# Patient Record
Sex: FEMALE | Race: WHITE | Employment: OTHER | URBAN - METROPOLITAN AREA
[De-identification: names, ages, dates, MRNs, and addresses within clinical notes are randomized per-mention and may not be internally consistent; named-entity substitution may affect disease eponyms.]

---

## 2017-06-13 ENCOUNTER — DOCTOR'S OFFICE (OUTPATIENT)
Dept: URBAN - METROPOLITAN AREA CLINIC 125 | Facility: CLINIC | Age: 27
Setting detail: OPHTHALMOLOGY
End: 2017-06-13
Payer: COMMERCIAL

## 2017-06-13 DIAGNOSIS — H40.1132: ICD-10-CM

## 2017-06-13 DIAGNOSIS — H27.8: ICD-10-CM

## 2017-06-13 DIAGNOSIS — H26.40: ICD-10-CM

## 2017-06-13 PROCEDURE — 92012 INTRM OPH EXAM EST PATIENT: CPT | Performed by: OPHTHALMOLOGY

## 2017-06-13 ASSESSMENT — REFRACTION_MANIFEST
OD_VA2: 20/
OD_VA1: 20/
OU_VA: 20/
OD_VA1: 20/
OD_VA2: 20/
OS_VA3: 20/
OD_VA3: 20/
OD_VA3: 20/
OS_VA2: 20/
OS_VA3: 20/
OS_VA1: 20/
OD_VA3: 20/
OS_VA3: 20/
OD_VA1: 20/
OD_VA2: 20/
OU_VA: 20/
OS_VA2: 20/
OS_VA2: 20/
OS_VA1: 20/
OU_VA: 20/
OS_VA1: 20/

## 2017-06-13 ASSESSMENT — VISUAL ACUITY
OS_BCVA: 20/F&F
OD_BCVA: 20/F&F

## 2017-06-13 ASSESSMENT — REFRACTION_CURRENTRX
OS_OVR_VA: 20/
OD_OVR_VA: 20/

## 2017-06-13 ASSESSMENT — CONFRONTATIONAL VISUAL FIELD TEST (CVF)
OS_FINDINGS: FULL
OD_FINDINGS: FULL

## 2017-06-13 ASSESSMENT — SUPERFICIAL PUNCTATE KERATITIS (SPK)
OD_SPK: T
OS_SPK: T

## 2018-08-16 ENCOUNTER — DOCTOR'S OFFICE (OUTPATIENT)
Dept: URBAN - METROPOLITAN AREA CLINIC 125 | Facility: CLINIC | Age: 28
Setting detail: OPHTHALMOLOGY
End: 2018-08-16
Payer: COMMERCIAL

## 2018-08-16 DIAGNOSIS — H26.40: ICD-10-CM

## 2018-08-16 DIAGNOSIS — H40.1132: ICD-10-CM

## 2018-08-16 DIAGNOSIS — H27.8: ICD-10-CM

## 2018-08-16 PROCEDURE — 92014 COMPRE OPH EXAM EST PT 1/>: CPT | Performed by: OPHTHALMOLOGY

## 2018-08-16 ASSESSMENT — REFRACTION_MANIFEST
OS_VA2: 20/
OD_VA2: 20/
OD_VA1: 20/
OS_VA3: 20/
OD_VA3: 20/
OS_VA1: 20/
OS_VA1: 20/
OS_VA3: 20/
OS_VA3: 20/
OS_VA2: 20/
OD_VA2: 20/
OS_VA2: 20/
OU_VA: 20/
OD_VA1: 20/
OU_VA: 20/
OU_VA: 20/
OD_VA2: 20/
OD_VA1: 20/
OD_VA3: 20/
OS_VA1: 20/
OD_VA3: 20/

## 2018-08-16 ASSESSMENT — CONFRONTATIONAL VISUAL FIELD TEST (CVF)
OD_FINDINGS: FULL
OS_FINDINGS: FULL

## 2018-08-16 ASSESSMENT — REFRACTION_CURRENTRX
OS_OVR_VA: 20/
OD_OVR_VA: 20/
OS_OVR_VA: 20/
OD_OVR_VA: 20/
OD_OVR_VA: 20/
OS_OVR_VA: 20/

## 2018-08-16 ASSESSMENT — SUPERFICIAL PUNCTATE KERATITIS (SPK)
OS_SPK: T
OD_SPK: T

## 2018-08-16 ASSESSMENT — VISUAL ACUITY
OD_BCVA: 20/UNABLE
OS_BCVA: 20/UNABLE

## 2018-10-22 ENCOUNTER — OFFICE VISIT (OUTPATIENT)
Dept: URGENT CARE | Facility: CLINIC | Age: 28
End: 2018-10-22
Payer: MEDICARE

## 2018-10-22 VITALS
DIASTOLIC BLOOD PRESSURE: 69 MMHG | WEIGHT: 169.4 LBS | OXYGEN SATURATION: 96 % | SYSTOLIC BLOOD PRESSURE: 107 MMHG | RESPIRATION RATE: 16 BRPM | TEMPERATURE: 97.7 F | HEART RATE: 100 BPM

## 2018-10-22 DIAGNOSIS — J06.9 VIRAL UPPER RESPIRATORY TRACT INFECTION: ICD-10-CM

## 2018-10-22 DIAGNOSIS — R35.0 INCREASED URINARY FREQUENCY: Primary | ICD-10-CM

## 2018-10-22 LAB
SL AMB  POCT GLUCOSE, UA: NEGATIVE
SL AMB LEUKOCYTE ESTERASE,UA: ABNORMAL
SL AMB POCT BILIRUBIN,UA: NEGATIVE
SL AMB POCT BLOOD,UA: ABNORMAL
SL AMB POCT CLARITY,UA: CLEAR
SL AMB POCT COLOR,UA: YELLOW
SL AMB POCT KETONES,UA: ABNORMAL
SL AMB POCT NITRITE,UA: NEGATIVE
SL AMB POCT PH,UA: 5
SL AMB POCT SPECIFIC GRAVITY,UA: 1.03
SL AMB POCT URINE PROTEIN: ABNORMAL
SL AMB POCT UROBILINOGEN: NORMAL

## 2018-10-22 PROCEDURE — 87086 URINE CULTURE/COLONY COUNT: CPT | Performed by: EMERGENCY MEDICINE

## 2018-10-22 PROCEDURE — 99203 OFFICE O/P NEW LOW 30 MIN: CPT | Performed by: EMERGENCY MEDICINE

## 2018-10-22 RX ORDER — NITROFURANTOIN 25; 75 MG/1; MG/1
100 CAPSULE ORAL 2 TIMES DAILY
Qty: 14 CAPSULE | Refills: 0 | Status: SHIPPED | OUTPATIENT
Start: 2018-10-22 | End: 2018-10-29

## 2018-10-22 RX ORDER — PREDNISONE 10 MG/1
TABLET ORAL
Qty: 27 TABLET | Refills: 0 | Status: SHIPPED | OUTPATIENT
Start: 2018-10-22 | End: 2019-04-10

## 2018-10-22 RX ORDER — PHENAZOPYRIDINE HYDROCHLORIDE 200 MG/1
200 TABLET, FILM COATED ORAL
Qty: 10 TABLET | Refills: 0 | Status: SHIPPED | OUTPATIENT
Start: 2018-10-22 | End: 2019-04-10

## 2018-10-22 RX ORDER — LATANOPROST 50 UG/ML
SOLUTION/ DROPS OPHTHALMIC
COMMUNITY
Start: 2018-10-18

## 2018-10-22 RX ORDER — DORZOLAMIDE HYDROCHLORIDE AND TIMOLOL MALEATE 20; 5 MG/ML; MG/ML
SOLUTION/ DROPS OPHTHALMIC
COMMUNITY
Start: 2018-09-20

## 2018-10-22 RX ORDER — RISPERIDONE 2 MG/1
TABLET, FILM COATED ORAL
COMMUNITY
Start: 2018-10-18

## 2018-10-22 NOTE — PATIENT INSTRUCTIONS
Dysuria   WHAT YOU NEED TO KNOW:   Dysuria is difficulty urinating, or pain, burning, or discomfort with urination  Dysuria is usually a symptom of another problem  DISCHARGE INSTRUCTIONS:   Return to the emergency department if:   · You have severe back, side, or abdominal pain  · You have fever and shaking chills  · You vomit several times in a row  Contact your healthcare provider if:   · Your symptoms do not go away, even after treatment  · You have questions or concerns about your condition or care  Medicines:   · Medicines  may be given to help treat a bacterial infection or help decrease bladder spasms  · Take your medicine as directed  Contact your healthcare provider if you think your medicine is not helping or if you have side effects  Tell him of her if you are allergic to any medicine  Keep a list of the medicines, vitamins, and herbs you take  Include the amounts, and when and why you take them  Bring the list or the pill bottles to follow-up visits  Carry your medicine list with you in case of an emergency  Follow up with your healthcare provider as directed: Your healthcare provider may also refer you to a urologist or nephrologist to have additional testing  Write down your questions so you remember to ask them during your visits  Manage your dysuria:   · Drink more liquids  Liquids help flush out bacteria that may be causing an infection  Ask your healthcare provider how much liquid to drink each day and which liquids are best for you  · Take sitz baths as directed  Fill a bathtub with 4 to 6 inches of warm water  You may also use a sitz bath pan that fits over a toilet  Sit in the sitz bath for 20 minutes  Do this 2 to 3 times a day, or as directed  The warm water can help decrease pain and swelling  © 2017 [employer] Information is for End User's use only and may not be sold, redistributed or otherwise used for commercial purposes   All illustrations and images included in CareNotes® are the copyrighted property of [employer]  or [employer]  The above information is an  only  It is not intended as medical advice for individual conditions or treatments  Talk to your doctor, nurse or pharmacist before following any medical regimen to see if it is safe and effective for you  You have been diagnosed with a Viral Upper Respiratory infection and your symptoms should resolve over the next 7 to 10 days with the treatments recommended today  If they do not, it is possible that you have developed a bacterial infection and you should return  If you were to take the antibiotic while you are still in the viral stage, you will not get better any faster, but could kill off the good germs in your body as well as make the germs in you resistant to the antibiotic  Take an expectorant - guaifenesin should be the only ingredient - during the day, and the cough suppressant (ex  Robitussin DM or Tessalon) if needed at night only  Take Zinc 12 5 to 15 mg every 2 - 3 hrs while awake for the next few days  You may take Cold Eh (13 3 mg of Zinc) or split a 25 mg Zinc tablet or lozenge in two or a 50 mg into four to get the proper dose  The total daily dose of Zinc should exceed 75 mg per day  You may also take a decongestant like Sudafed, unless you have hypertension or cardiac disease  Hold any NSAIDs like Ibuprofen (Advil), Naprosyn (Aleve), etc while on steroids like Medrol or Prednisone      Upper Respiratory Infection   AMBULATORY CARE:   An upper respiratory infection  is also called a common cold  It can affect your nose, throat, ears, and sinuses  Common signs and symptoms include the following:  Cold symptoms are usually worst for the first 3 to 5 days   You may have any of the following:  · Runny or stuffy nose    · Sneezing and coughing    · Sore throat or hoarseness    · Red, watery, and sore eyes    · Fatigue     · Chills and fever    · Headache, body aches, or sore muscles  Seek care immediately if:   · You have chest pain or trouble breathing  Contact your healthcare provider if:   · You have a fever over 102ºF (39°C)  · Your sore throat gets worse or you see white or yellow spots in your throat  · Your symptoms get worse after 3 to 5 days or your cold is not better in 14 days  · You have a rash anywhere on your skin  · You have large, tender lumps in your neck  · You have thick, green or yellow drainage from your nose  · You cough up thick yellow, green, or bloody mucus  · You have vomiting for more than 24 hours and cannot keep fluids down  · You have a bad earache  · You have questions or concerns about your condition or care  Treatment for a cold: There is no cure for the common cold  Colds are caused by viruses and do not get better with antibiotics  Most people get better in 7 to 14 days  You may continue to cough for 2 to 3 weeks  The following may help decrease your symptoms:  · Decongestants  help reduce nasal congestion and help you breathe more easily  If you take decongestant pills, they may make you feel restless or not able to sleep  Do not use decongestant sprays for more than a few days  · Cough suppressants  help reduce coughing  Ask your healthcare provider which type of cough medicine is best for you  · NSAIDs , such as ibuprofen, help decrease swelling, pain, and fever  NSAIDs can cause stomach bleeding or kidney problems in certain people  If you take blood thinner medicine, always ask your healthcare provider if NSAIDs are safe for you  Always read the medicine label and follow directions  · Acetaminophen  decreases pain and fever  It is available without a doctor's order  Ask how much to take and how often to take it  Follow directions   Read the labels of all other medicines you are using to see if they also contain acetaminophen, or ask your doctor or pharmacist  Acetaminophen can cause liver damage if not taken correctly  Do not use more than 4 grams (4,000 milligrams) total of acetaminophen in one day  Manage your cold:   · Rest as much as possible  Slowly start to do more each day  · Drink more liquids as directed  Liquids will help thin and loosen mucus so you can cough it up  Liquids will also help prevent dehydration  Liquids that help prevent dehydration include water, fruit juice, and broth  Do not drink liquids that contain caffeine  Caffeine can increase your risk for dehydration  Ask your healthcare provider how much liquid to drink each day  · Soothe a sore throat  Gargle with warm salt water  This helps your sore throat feel better  Make salt water by dissolving ¼ teaspoon salt in 1 cup warm water  You may also suck on hard candy or throat lozenges  You may use a sore throat spray  · Use a humidifier or vaporizer  Use a cool mist humidifier or a vaporizer to increase air moisture in your home  This may make it easier for you to breathe and help decrease your cough  · Use saline nasal drops as directed  These help relieve congestion  · Apply petroleum-based jelly around the outside of your nostrils  This can decrease irritation from blowing your nose  · Do not smoke  Nicotine and other chemicals in cigarettes and cigars can make your symptoms worse  They can also cause infections such as bronchitis or pneumonia  Ask your healthcare provider for information if you currently smoke and need help to quit  E-cigarettes or smokeless tobacco still contain nicotine  Talk to your healthcare provider before you use these products  Prevent spreading your cold to others:   · Try to stay away from other people during the first 2 to 3 days of your cold when it is more easily spread  · Do not share food or drinks  · Do not share hand towels with household members  · Wash your hands often, especially after you blow your nose   Turn away from other people and cover your mouth and nose with a tissue when you sneeze or cough  Follow up with your healthcare provider as directed:  Write down your questions so you remember to ask them during your visits  © 2017 [address] [employer] Information is for End User's use only and may not be sold, redistributed or otherwise used for commercial purposes  All illustrations and images included in CareNotes® are the copyrighted property of [employer]  or [employer]  The above information is an  only  It is not intended as medical advice for individual conditions or treatments  Talk to your doctor, nurse or pharmacist before following any medical regimen to see if it is safe and effective for you

## 2018-10-22 NOTE — PROGRESS NOTES
[facility]        NAME: Hipolito Chávez is a [address]  female  : 1990    MRN: [id number]  DATE: 2018  TIME: 10:43 AM    Assessment and Plan   Increased urinary frequency [R35 0]  1  Increased urinary frequency  POCT urine dip    nitrofurantoin (MACROBID) 100 mg capsule    phenazopyridine (PYRIDIUM) 200 mg tablet   2  Viral upper respiratory tract infection  predniSONE 10 mg tablet         Patient Instructions     Patient Instructions     Dysuria   WHAT YOU NEED TO KNOW:   Dysuria is difficulty urinating, or pain, burning, or discomfort with urination  Dysuria is usually a symptom of another problem  DISCHARGE INSTRUCTIONS:   Return to the emergency department if:   · You have severe back, side, or abdominal pain  · You have fever and shaking chills  · You vomit several times in a row  Contact your healthcare provider if:   · Your symptoms do not go away, even after treatment  · You have questions or concerns about your condition or care  Medicines:   · Medicines  may be given to help treat a bacterial infection or help decrease bladder spasms  · Take your medicine as directed  Contact your healthcare provider if you think your medicine is not helping or if you have side effects  Tell him of her if you are allergic to any medicine  Keep a list of the medicines, vitamins, and herbs you take  Include the amounts, and when and why you take them  Bring the list or the pill bottles to follow-up visits  Carry your medicine list with you in case of an emergency  Follow up with your healthcare provider as directed: Your healthcare provider may also refer you to a urologist or nephrologist to have additional testing  Write down your questions so you remember to ask them during your visits  Manage your dysuria:   · Drink more liquids  Liquids help flush out bacteria that may be causing an infection   Ask your healthcare provider how much liquid to drink each day and which liquids are best for you  · Take sitz baths as directed  Fill a bathtub with 4 to 6 inches of warm water  You may also use a sitz bath pan that fits over a toilet  Sit in the sitz bath for 20 minutes  Do this 2 to 3 times a day, or as directed  The warm water can help decrease pain and swelling  © 2017 [address] [employer] Information is for End User's use only and may not be sold, redistributed or otherwise used for commercial purposes  All illustrations and images included in CareNotes® are the copyrighted property of [employer]  or [employer]  The above information is an  only  It is not intended as medical advice for individual conditions or treatments  Talk to your doctor, nurse or pharmacist before following any medical regimen to see if it is safe and effective for you  You have been diagnosed with a Viral Upper Respiratory infection and your symptoms should resolve over the next 7 to 10 days with the treatments recommended today  If they do not, it is possible that you have developed a bacterial infection and you should return  If you were to take the antibiotic while you are still in the viral stage, you will not get better any faster, but could kill off the good germs in your body as well as make the germs in you resistant to the antibiotic  Take an expectorant - guaifenesin should be the only ingredient - during the day, and the cough suppressant (ex  Robitussin DM or Tessalon) if needed at night only  Take Zinc 12 5 to 15 mg every 2 - 3 hrs while awake for the next few days  You may take Cold He (13 3 mg of Zinc) or split a 25 mg Zinc tablet or lozenge in two or a 50 mg into four to get the proper dose  The total daily dose of Zinc should exceed 75 mg per day  You may also take a decongestant like Sudafed, unless you have hypertension or cardiac disease    Hold any NSAIDs like Ibuprofen (Advil), Naprosyn (Aleve), etc while on steroids like Medrol or Prednisone      Upper Respiratory Infection   AMBULATORY CARE:   An upper respiratory infection  is also called a common cold  It can affect your nose, throat, ears, and sinuses  Common signs and symptoms include the following:  Cold symptoms are usually worst for the first 3 to 5 days  You may have any of the following:  · Runny or stuffy nose    · Sneezing and coughing    · Sore throat or hoarseness    · Red, watery, and sore eyes    · Fatigue     · Chills and fever    · Headache, body aches, or sore muscles  Seek care immediately if:   · You have chest pain or trouble breathing  Contact your healthcare provider if:   · You have a fever over 102ºF (39°C)  · Your sore throat gets worse or you see white or yellow spots in your throat  · Your symptoms get worse after 3 to 5 days or your cold is not better in 14 days  · You have a rash anywhere on your skin  · You have large, tender lumps in your neck  · You have thick, green or yellow drainage from your nose  · You cough up thick yellow, green, or bloody mucus  · You have vomiting for more than 24 hours and cannot keep fluids down  · You have a bad earache  · You have questions or concerns about your condition or care  Treatment for a cold: There is no cure for the common cold  Colds are caused by viruses and do not get better with antibiotics  Most people get better in 7 to 14 days  You may continue to cough for 2 to 3 weeks  The following may help decrease your symptoms:  · Decongestants  help reduce nasal congestion and help you breathe more easily  If you take decongestant pills, they may make you feel restless or not able to sleep  Do not use decongestant sprays for more than a few days  · Cough suppressants  help reduce coughing  Ask your healthcare provider which type of cough medicine is best for you  · NSAIDs , such as ibuprofen, help decrease swelling, pain, and fever   NSAIDs can cause stomach bleeding or kidney problems in certain people  If you take blood thinner medicine, always ask your healthcare provider if NSAIDs are safe for you  Always read the medicine label and follow directions  · Acetaminophen  decreases pain and fever  It is available without a doctor's order  Ask how much to take and how often to take it  Follow directions  Read the labels of all other medicines you are using to see if they also contain acetaminophen, or ask your doctor or pharmacist  Acetaminophen can cause liver damage if not taken correctly  Do not use more than 4 grams (4,000 milligrams) total of acetaminophen in one day  Manage your cold:   · Rest as much as possible  Slowly start to do more each day  · Drink more liquids as directed  Liquids will help thin and loosen mucus so you can cough it up  Liquids will also help prevent dehydration  Liquids that help prevent dehydration include water, fruit juice, and broth  Do not drink liquids that contain caffeine  Caffeine can increase your risk for dehydration  Ask your healthcare provider how much liquid to drink each day  · Soothe a sore throat  Gargle with warm salt water  This helps your sore throat feel better  Make salt water by dissolving ¼ teaspoon salt in 1 cup warm water  You may also suck on hard candy or throat lozenges  You may use a sore throat spray  · Use a humidifier or vaporizer  Use a cool mist humidifier or a vaporizer to increase air moisture in your home  This may make it easier for you to breathe and help decrease your cough  · Use saline nasal drops as directed  These help relieve congestion  · Apply petroleum-based jelly around the outside of your nostrils  This can decrease irritation from blowing your nose  · Do not smoke  Nicotine and other chemicals in cigarettes and cigars can make your symptoms worse  They can also cause infections such as bronchitis or pneumonia   Ask your healthcare provider for information if you currently smoke and need help to quit  E-cigarettes or smokeless tobacco still contain nicotine  Talk to your healthcare provider before you use these products  Prevent spreading your cold to others:   · Try to stay away from other people during the first 2 to 3 days of your cold when it is more easily spread  · Do not share food or drinks  · Do not share hand towels with household members  · Wash your hands often, especially after you blow your nose  Turn away from other people and cover your mouth and nose with a tissue when you sneeze or cough  Follow up with your healthcare provider as directed:  Write down your questions so you remember to ask them during your visits  © 2017 [address] [employer] Information is for End User's use only and may not be sold, redistributed or otherwise used for commercial purposes  All illustrations and images included in CareNotes® are the copyrighted property of [employer]  or [employer]  The above information is an  only  It is not intended as medical advice for individual conditions or treatments  Talk to your doctor, nurse or pharmacist before following any medical regimen to see if it is safe and effective for you  Follow up with PCP in 3-5 days  Proceed to  ER if symptoms worsen  Chief Complaint     Chief Complaint   Patient presents with    Cough     sister states cough with wheezing for a week  also stating pt is constantly asking to use restroom  History of Present Illness       Patient with congestion and cough for the past week according to sister  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 lives with sister, who is her primary caretaker  Patient has been urinating frequently according to sister  She has also been wheezing at times according to sister  Review of Systems   Review of Systems   Constitutional: Negative for chills and fever     HENT: Positive for congestion, rhinorrhea, sinus pressure and sore throat  Negative for trouble swallowing and voice change  Respiratory: Positive for cough  Negative for chest tightness, shortness of breath and wheezing  Cardiovascular: Negative for chest pain  Genitourinary: Positive for dysuria, frequency and urgency  Negative for difficulty urinating, flank pain and hematuria  Musculoskeletal: Negative for back pain  Current Medications       Current Outpatient Prescriptions:     dorzolamide-timolol (COSOPT) 22 3-6 8 MG/ML ophthalmic solution, , Disp: , Rfl:     latanoprost (XALATAN) 0 005 % ophthalmic solution, , Disp: , Rfl:     risperiDONE (RisperDAL) 2 mg tablet, , Disp: , Rfl:     nitrofurantoin (MACROBID) 100 mg capsule, Take 1 capsule (100 mg total) by mouth 2 (two) times a day for 7 days, Disp: 14 capsule, Rfl: 0    phenazopyridine (PYRIDIUM) 200 mg tablet, Take 1 tablet (200 mg total) by mouth 3 (three) times a day with meals, Disp: 10 tablet, Rfl: 0    predniSONE 10 mg tablet, Take once daily all days pills on this schedule 6- 6- 5- 4- 3- 2- 1, Disp: 27 tablet, Rfl: 0    Current Allergies     Allergies as of 10/22/2018    (No Known Allergies)            The following portions of the patient's history were reviewed and updated as appropriate: allergies, current medications, past family history, past medical history, past social history, past surgical history and problem list      Past Medical History:   Diagnosis Date    Angelman syndrome     Epilepsy ([facility] [address] )     Glaucoma        Past Surgical History:   Procedure Laterality Date    CATARACT EXTRACTION         Family History   Problem Relation Age of Onset    No Known Problems Sister          Medications have been verified          Objective   /69 (BP Location: Left arm, Patient Position: Sitting, Cuff Size: Adult)   Pulse 100   Temp 97 7 °F (36 5 °C) (Tympanic)   Resp 16   Wt 76 8 kg (169 lb 6 4 oz)   SpO2 96%        Physical Exam     Physical Exam Constitutional: She is oriented to person, place, and time  She appears well-developed and well-nourished  No distress  HENT:   Head: Normocephalic and atraumatic  Right Ear: Tympanic membrane and external ear normal    Left Ear: Tympanic membrane and external ear normal    Nose: Mucosal edema present  Mouth/Throat: Posterior oropharyngeal erythema present  No oropharyngeal exudate or tonsillar abscesses  Neck: Neck supple  Cardiovascular: Normal rate and regular rhythm  Pulmonary/Chest: Effort normal and breath sounds normal    Abdominal: Soft  Bowel sounds are normal  She exhibits no distension and no mass  There is no tenderness  There is no rebound and no guarding  Neurological: She is alert and oriented to person, place, and time  Skin: Skin is warm and dry  Nursing note and vitals reviewed

## 2018-10-23 LAB — BACTERIA UR CULT: NORMAL

## 2019-03-17 ENCOUNTER — OFFICE VISIT (OUTPATIENT)
Dept: URGENT CARE | Facility: CLINIC | Age: 29
End: 2019-03-17
Payer: MEDICARE

## 2019-03-17 VITALS
RESPIRATION RATE: 18 BRPM | HEIGHT: 60 IN | SYSTOLIC BLOOD PRESSURE: 116 MMHG | TEMPERATURE: 97.5 F | DIASTOLIC BLOOD PRESSURE: 79 MMHG | HEART RATE: 73 BPM | WEIGHT: 171.2 LBS | BODY MASS INDEX: 33.61 KG/M2 | OXYGEN SATURATION: 98 %

## 2019-03-17 DIAGNOSIS — H66.90 ACUTE OTITIS MEDIA, UNSPECIFIED OTITIS MEDIA TYPE: Primary | ICD-10-CM

## 2019-03-17 LAB — S PYO AG THROAT QL: NEGATIVE

## 2019-03-17 PROCEDURE — 87430 STREP A AG IA: CPT | Performed by: PHYSICIAN ASSISTANT

## 2019-03-17 PROCEDURE — G0463 HOSPITAL OUTPT CLINIC VISIT: HCPCS | Performed by: PHYSICIAN ASSISTANT

## 2019-03-17 PROCEDURE — 99213 OFFICE O/P EST LOW 20 MIN: CPT | Performed by: PHYSICIAN ASSISTANT

## 2019-03-17 RX ORDER — AMOXICILLIN 500 MG/1
500 CAPSULE ORAL EVERY 8 HOURS SCHEDULED
Qty: 30 CAPSULE | Refills: 0 | Status: SHIPPED | OUTPATIENT
Start: 2019-03-17 | End: 2019-03-27

## 2019-03-17 NOTE — PROGRESS NOTES
[facility]        NAME: Bebeto Ramos is a 25 y o  female  : 1990    MRN: [id number]  DATE: 2019  TIME: 1:40 PM    Assessment and Plan   Acute otitis media, unspecified otitis media type [H66 90]  1  Acute otitis media, unspecified otitis media type  POCT rapid strepA    amoxicillin (AMOXIL) 500 mg capsule     Patient Instructions     Take antibiotic as prescribed  Follow up with PCP in 3-5 days  Proceed to  ER if symptoms worsen  Chief Complaint     Chief Complaint   Patient presents with    Sore Throat     holding ears, yellow mucous out of nose, started 5 days ago         History of Present Illness       URI    This is a new problem  Episode onset: 5 days ago  The problem has been gradually worsening  Associated symptoms include congestion, coughing, ear pain and rhinorrhea  Pertinent negatives include no abdominal pain, chest pain, diarrhea, dysuria, headaches, joint swelling, rash, sneezing, vomiting or wheezing  She has tried nothing for the symptoms  Review of Systems   Review of Systems   HENT: Positive for congestion, ear pain and rhinorrhea  Negative for sneezing  Respiratory: Positive for cough  Negative for wheezing  Cardiovascular: Negative for chest pain  Gastrointestinal: Negative for abdominal pain, diarrhea and vomiting  Genitourinary: Negative for dysuria  Skin: Negative for rash  Neurological: Negative for headaches           Current Medications       Current Outpatient Medications:     amoxicillin (AMOXIL) 500 mg capsule, Take 1 capsule (500 mg total) by mouth every 8 (eight) hours for 10 days, Disp: 30 capsule, Rfl: 0    dorzolamide-timolol (COSOPT) 22 3-6 8 MG/ML ophthalmic solution, , Disp: , Rfl:     latanoprost (XALATAN) 0 005 % ophthalmic solution, , Disp: , Rfl:     phenazopyridine (PYRIDIUM) 200 mg tablet, Take 1 tablet (200 mg total) by mouth 3 (three) times a day with meals, Disp: 10 tablet, Rfl: 0    predniSONE 10 mg tablet, Take once daily all days pills on this schedule 6- 6- 5- 4- 3- 2- 1, Disp: 27 tablet, Rfl: 0    risperiDONE (RisperDAL) 2 mg tablet, , Disp: , Rfl:     Current Allergies     Allergies as of 03/17/2019    (No Known Allergies)            The following portions of the patient's history were reviewed and updated as appropriate: allergies, current medications, past family history, past medical history, past social history, past surgical history and problem list      Past Medical History:   Diagnosis Date    Angelman syndrome     Epilepsy ([facility] [address] )     Glaucoma        Past Surgical History:   Procedure Laterality Date    CATARACT EXTRACTION         Family History   Problem Relation Age of Onset    No Known Problems Sister          Medications have been verified  Objective   /79   Pulse 73   Temp 97 5 °F (36 4 °C)   Resp 18   Ht 5' (1 524 m)   Wt 77 7 kg (171 lb 3 2 oz)   SpO2 98%   BMI 33 44 kg/m²        Physical Exam     Physical Exam   Constitutional: She appears well-developed and well-nourished  Non-toxic appearance  She does not appear ill  HENT:   Head: Normocephalic  Right Ear: Hearing, tympanic membrane and ear canal normal  No drainage, swelling or tenderness  Tympanic membrane is not erythematous and not bulging  Left Ear: Hearing and ear canal normal  No drainage, swelling or tenderness  Tympanic membrane is erythematous and bulging  Nose: Mucosal edema and rhinorrhea present  Mouth/Throat: Posterior oropharyngeal erythema present  No oropharyngeal exudate or posterior oropharyngeal edema  Pulmonary/Chest: No stridor  She has no decreased breath sounds  She has no wheezes  She has no rhonchi  She has no rales  Lymphadenopathy:     She has cervical adenopathy  Right cervical: Superficial cervical adenopathy present  Left cervical: Superficial cervical adenopathy present

## 2019-04-10 ENCOUNTER — OFFICE VISIT (OUTPATIENT)
Dept: URGENT CARE | Facility: CLINIC | Age: 29
End: 2019-04-10
Payer: MEDICARE

## 2019-04-10 VITALS
HEART RATE: 62 BPM | RESPIRATION RATE: 16 BRPM | OXYGEN SATURATION: 99 % | WEIGHT: 168 LBS | DIASTOLIC BLOOD PRESSURE: 72 MMHG | TEMPERATURE: 97 F | HEIGHT: 57 IN | SYSTOLIC BLOOD PRESSURE: 109 MMHG | BODY MASS INDEX: 36.24 KG/M2

## 2019-04-10 DIAGNOSIS — K12.0 ORAL APHTHAE: Primary | ICD-10-CM

## 2019-04-10 PROCEDURE — G0463 HOSPITAL OUTPT CLINIC VISIT: HCPCS | Performed by: EMERGENCY MEDICINE

## 2019-04-10 PROCEDURE — 99213 OFFICE O/P EST LOW 20 MIN: CPT | Performed by: EMERGENCY MEDICINE

## 2020-10-13 ENCOUNTER — RX ONLY (RX ONLY)
Age: 30
End: 2020-10-13

## 2020-11-30 ENCOUNTER — NURSE TRIAGE (OUTPATIENT)
Dept: OTHER | Facility: OTHER | Age: 30
End: 2020-11-30

## 2020-11-30 DIAGNOSIS — Z20.822 EXPOSURE TO COVID-19 VIRUS: Primary | ICD-10-CM

## 2020-12-01 DIAGNOSIS — Z20.822 EXPOSURE TO COVID-19 VIRUS: ICD-10-CM

## 2020-12-01 PROCEDURE — U0003 INFECTIOUS AGENT DETECTION BY NUCLEIC ACID (DNA OR RNA); SEVERE ACUTE RESPIRATORY SYNDROME CORONAVIRUS 2 (SARS-COV-2) (CORONAVIRUS DISEASE [COVID-19]), AMPLIFIED PROBE TECHNIQUE, MAKING USE OF HIGH THROUGHPUT TECHNOLOGIES AS DESCRIBED BY CMS-2020-01-R: HCPCS | Performed by: FAMILY MEDICINE

## 2020-12-02 ENCOUNTER — TELEPHONE (OUTPATIENT)
Dept: CARDIOLOGY CLINIC | Facility: CLINIC | Age: 30
End: 2020-12-02

## 2020-12-02 LAB — SARS-COV-2 RNA SPEC QL NAA+PROBE: DETECTED

## 2021-01-14 ENCOUNTER — DOCTOR'S OFFICE (OUTPATIENT)
Dept: URBAN - METROPOLITAN AREA CLINIC 125 | Facility: CLINIC | Age: 31
Setting detail: OPHTHALMOLOGY
End: 2021-01-14
Payer: COMMERCIAL

## 2021-01-14 DIAGNOSIS — H27.8: ICD-10-CM

## 2021-01-14 DIAGNOSIS — H26.40: ICD-10-CM

## 2021-01-14 DIAGNOSIS — H40.1132: ICD-10-CM

## 2021-01-14 PROCEDURE — 92014 COMPRE OPH EXAM EST PT 1/>: CPT | Performed by: OPHTHALMOLOGY

## 2021-01-14 ASSESSMENT — SUPERFICIAL PUNCTATE KERATITIS (SPK)
OS_SPK: T
OD_SPK: T

## 2021-01-14 ASSESSMENT — VISUAL ACUITY
OS_BCVA: 20/UNABLE
OD_BCVA: 20/UNABLE

## 2022-02-01 ENCOUNTER — DOCTOR'S OFFICE (OUTPATIENT)
Dept: URBAN - NONMETROPOLITAN AREA CLINIC 1 | Facility: CLINIC | Age: 32
Setting detail: OPHTHALMOLOGY
End: 2022-02-01
Payer: COMMERCIAL

## 2022-02-01 DIAGNOSIS — H26.40: ICD-10-CM

## 2022-02-01 DIAGNOSIS — H27.8: ICD-10-CM

## 2022-02-01 DIAGNOSIS — H40.1132: ICD-10-CM

## 2022-02-01 PROCEDURE — 99214 OFFICE O/P EST MOD 30 MIN: CPT | Performed by: OPHTHALMOLOGY

## 2022-02-01 ASSESSMENT — SUPERFICIAL PUNCTATE KERATITIS (SPK)
OD_SPK: T
OS_SPK: T

## 2022-02-01 ASSESSMENT — CONFRONTATIONAL VISUAL FIELD TEST (CVF)
OD_COMMENTS: UNABLE
OS_COMMENTS: UNABLE

## 2022-02-01 ASSESSMENT — VISUAL ACUITY
OD_BCVA: 20/UNABLE
OS_BCVA: 20/UNABLE

## 2023-04-25 ENCOUNTER — DOCTOR'S OFFICE (OUTPATIENT)
Dept: URBAN - NONMETROPOLITAN AREA CLINIC 1 | Facility: CLINIC | Age: 33
Setting detail: OPHTHALMOLOGY
End: 2023-04-25
Payer: COMMERCIAL

## 2023-04-25 DIAGNOSIS — H26.40: ICD-10-CM

## 2023-04-25 DIAGNOSIS — Z96.1: ICD-10-CM

## 2023-04-25 DIAGNOSIS — H40.1132: ICD-10-CM

## 2023-04-25 PROCEDURE — 92012 INTRM OPH EXAM EST PATIENT: CPT | Performed by: OPHTHALMOLOGY

## 2023-04-25 ASSESSMENT — SUPERFICIAL PUNCTATE KERATITIS (SPK)
OD_SPK: T
OS_SPK: T

## 2023-04-25 ASSESSMENT — VISUAL ACUITY
OS_BCVA: 20/UNABLE
OD_BCVA: 20/UNABLE

## 2023-04-25 ASSESSMENT — CONFRONTATIONAL VISUAL FIELD TEST (CVF)
OS_COMMENTS: UNABLE
OD_COMMENTS: UNABLE

## 2024-04-26 ENCOUNTER — DOCTOR'S OFFICE (OUTPATIENT)
Dept: URBAN - NONMETROPOLITAN AREA CLINIC 1 | Facility: CLINIC | Age: 34
Setting detail: OPHTHALMOLOGY
End: 2024-04-26
Payer: MEDICARE

## 2024-04-26 VITALS — HEIGHT: 55 IN

## 2024-04-26 DIAGNOSIS — H26.493: ICD-10-CM

## 2024-04-26 DIAGNOSIS — H40.1132: ICD-10-CM

## 2024-04-26 PROCEDURE — 92012 INTRM OPH EXAM EST PATIENT: CPT | Performed by: OPHTHALMOLOGY

## 2024-04-30 PROBLEM — H26.493 POSTERIOR CAPSULAR OPACIFICATION; BOTH EYES: Status: ACTIVE | Noted: 2024-04-26

## 2025-01-15 ENCOUNTER — DOCTOR'S OFFICE (OUTPATIENT)
Dept: URBAN - NONMETROPOLITAN AREA CLINIC 1 | Facility: CLINIC | Age: 35
Setting detail: OPHTHALMOLOGY
End: 2025-01-15
Payer: COMMERCIAL

## 2025-01-15 DIAGNOSIS — H26.493: ICD-10-CM

## 2025-01-15 DIAGNOSIS — H40.1132: ICD-10-CM

## 2025-01-15 PROBLEM — H16.223 DRY EYE SYNDROME K SICCA; BOTH EYES: Status: ACTIVE | Noted: 2025-01-15

## 2025-01-15 PROCEDURE — 92014 COMPRE OPH EXAM EST PT 1/>: CPT | Performed by: OPHTHALMOLOGY

## 2025-01-15 ASSESSMENT — VISUAL ACUITY
OS_BCVA: 20/UNABLE
OD_BCVA: 20/UNABLE

## 2025-01-15 ASSESSMENT — CONFRONTATIONAL VISUAL FIELD TEST (CVF)
OD_COMMENTS: UNABLE
OS_COMMENTS: UNABLE

## 2025-03-02 ENCOUNTER — APPOINTMENT (OUTPATIENT)
Dept: RADIOLOGY | Facility: CLINIC | Age: 35
End: 2025-03-02
Payer: MEDICARE

## 2025-03-02 ENCOUNTER — OFFICE VISIT (OUTPATIENT)
Dept: URGENT CARE | Facility: CLINIC | Age: 35
End: 2025-03-02
Payer: MEDICARE

## 2025-03-02 VITALS
BODY MASS INDEX: 27.29 KG/M2 | RESPIRATION RATE: 18 BRPM | DIASTOLIC BLOOD PRESSURE: 68 MMHG | SYSTOLIC BLOOD PRESSURE: 103 MMHG | OXYGEN SATURATION: 97 % | HEART RATE: 102 BPM | TEMPERATURE: 97.8 F | HEIGHT: 58 IN | WEIGHT: 130 LBS

## 2025-03-02 DIAGNOSIS — R50.9 FEVER, UNSPECIFIED FEVER CAUSE: ICD-10-CM

## 2025-03-02 DIAGNOSIS — R50.9 FEVER, UNSPECIFIED FEVER CAUSE: Primary | ICD-10-CM

## 2025-03-02 LAB
SL AMB  POCT GLUCOSE, UA: NEGATIVE
SL AMB LEUKOCYTE ESTERASE,UA: NORMAL
SL AMB POCT BILIRUBIN,UA: NEGATIVE
SL AMB POCT BLOOD,UA: NEGATIVE
SL AMB POCT CLARITY,UA: CLEAR
SL AMB POCT COLOR,UA: YELLOW
SL AMB POCT KETONES,UA: NORMAL
SL AMB POCT NITRITE,UA: NEGATIVE
SL AMB POCT PH,UA: 5
SL AMB POCT SPECIFIC GRAVITY,UA: 1.03
SL AMB POCT URINE PROTEIN: NORMAL
SL AMB POCT UROBILINOGEN: NORMAL

## 2025-03-02 PROCEDURE — G0463 HOSPITAL OUTPT CLINIC VISIT: HCPCS

## 2025-03-02 PROCEDURE — 71046 X-RAY EXAM CHEST 2 VIEWS: CPT

## 2025-03-02 PROCEDURE — 87086 URINE CULTURE/COLONY COUNT: CPT

## 2025-03-02 PROCEDURE — 87636 SARSCOV2 & INF A&B AMP PRB: CPT

## 2025-03-02 PROCEDURE — 99204 OFFICE O/P NEW MOD 45 MIN: CPT

## 2025-03-02 PROCEDURE — 81002 URINALYSIS NONAUTO W/O SCOPE: CPT

## 2025-03-02 RX ORDER — LAMOTRIGINE 100 MG/1
100 TABLET ORAL DAILY
COMMUNITY
Start: 2024-12-28

## 2025-03-02 RX ORDER — GABAPENTIN 250 MG/5ML
SOLUTION ORAL
COMMUNITY
Start: 2025-02-20

## 2025-03-02 RX ORDER — IPRATROPIUM BROMIDE AND ALBUTEROL SULFATE 2.5; .5 MG/3ML; MG/3ML
3 SOLUTION RESPIRATORY (INHALATION) ONCE
Status: COMPLETED | OUTPATIENT
Start: 2025-03-02 | End: 2025-03-02

## 2025-03-02 RX ORDER — LAMOTRIGINE 25 MG/1
50 TABLET ORAL
COMMUNITY
Start: 2025-02-03

## 2025-03-02 RX ORDER — CITALOPRAM HYDROBROMIDE 10 MG/1
TABLET ORAL
COMMUNITY
Start: 2025-01-11

## 2025-03-02 RX ORDER — ALPRAZOLAM 0.5 MG
TABLET ORAL
COMMUNITY
Start: 2025-02-28

## 2025-03-02 RX ORDER — GABAPENTIN 100 MG/1
CAPSULE ORAL
COMMUNITY
Start: 2025-02-03

## 2025-03-02 RX ADMIN — IPRATROPIUM BROMIDE AND ALBUTEROL SULFATE 3 ML: 2.5; .5 SOLUTION RESPIRATORY (INHALATION) at 10:41

## 2025-03-02 NOTE — PROGRESS NOTES
[facility]        NAME: Maylin German is a 34 y.o. female  : 1990    MRN: [id number]  DATE: 2025  TIME: 12:26 PM    Assessment and Plan   Fever, unspecified fever cause [R50.9]  1. Fever, unspecified fever cause  Covid/Flu- Office Collect Normal    ipratropium-albuterol (DUO-NEB) 0.5-2.5 mg/3 mL inhalation solution 3 mL    XR chest pa and lateral    Covid/Flu- Office Collect Normal    POCT urine dip    Urine culture    Urine culture        Chest x-ray interpreted by myself. No infiltrate seen . Pending radiology review.   U/A was negative.   Covid/Flu swab is pending.   The sister was advised to take the patient to the ER for further eval if anything gets worse. She verbalized understanding.    Patient Instructions     Use a warm mist humidifier or vaporizer  Hot tea with honey.   Warm saline gargle or throat lozenge may help with a sore throat.  OTC saline nasal sprays   Drink plenty of fluids.   You can take over the counter cough and cold medicine  Follow up with PCP in 3-5 days.  Proceed to  ER if symptoms worsen.    If tests are performed, our office will contact you with results only if changes need to made to the care plan discussed with you at the visit. You can review your full results on [facility].    Chief Complaint     Chief Complaint   Patient presents with    Cough     Pts sister reports on and off fevers, cough, holding head signaling headache and having near syncopal episodes with the coughing starting approx 4 days ago. Last dose of motrin given at 0730. Requesting breathing tx         History of Present Illness       Patient is a 34YOF with a past medical history significant for epilepsy and angelman syndrome. She presents to the office with her sister who is the caregiver for fever, cough, congestion, post-tussive dizziness. Her sister is requesting a breathing treatment. The sister has been giving pedialyte and encouraging PO intake. She denies any vomiting or  indication of abdominal pain. Sister does report the patient is holding her head a lot so she thinks she has a headache. She has had UTI's in the past.     Cough  Associated symptoms include a fever, headaches and postnasal drip. Pertinent negatives include no ear pain, sore throat, shortness of breath or wheezing.       Review of Systems   Review of Systems   Constitutional:  Positive for activity change, appetite change and fever.   HENT:  Positive for congestion and postnasal drip. Negative for ear pain and sore throat.    Respiratory:  Positive for cough. Negative for shortness of breath and wheezing.    Gastrointestinal:  Positive for diarrhea. Negative for abdominal pain and vomiting.   Neurological:  Positive for headaches.   All other systems reviewed and are negative.        Current Medications       Current Outpatient Medications:     ALPRAZolam (XANAX) 0.5 mg tablet, , Disp: , Rfl:     citalopram (CeleXA) 10 mg tablet, , Disp: , Rfl:     dorzolamide-timolol (COSOPT) 22.3-6.8 MG/ML ophthalmic solution, , Disp: , Rfl:     gabapentin (NEURONTIN) 100 mg capsule, , Disp: , Rfl:     gabapentin (NEURONTIN) 250 mg/5 mL solution, , Disp: , Rfl:     lamoTRIgine (LaMICtal) 100 mg tablet, Take 100 mg by mouth daily, Disp: , Rfl:     lamoTRIgine (LaMICtal) 25 mg tablet, Take 50 mg by mouth daily at bedtime, Disp: , Rfl:     latanoprost (XALATAN) 0.005 % ophthalmic solution, , Disp: , Rfl:     benzocaine (ORABASE-B) 20 % PSTE, Apply 1 application to the mouth or throat 4 (four) times a day as needed for mucositis, Disp: 1 each, Rfl: 0    risperiDONE (RisperDAL) 2 mg tablet, , Disp: , Rfl:   No current facility-administered medications for this visit.    Current Allergies     Allergies as of 03/02/2025    (No Known Allergies)            The following portions of the patient's history were reviewed and updated as appropriate: allergies, current medications, past family history, past medical history, past social history,  "past surgical history and problem list.     Past Medical History:   Diagnosis Date    Angelman syndrome     Epilepsy (HCC)     Glaucoma     Sinus infection     Varicella        Past Surgical History:   Procedure Laterality Date    CATARACT EXTRACTION         Family History   Problem Relation Age of Onset    No Known Problems Sister          Medications have been verified.        Objective   /68   Pulse 102   Temp 97.8 °F (36.6 °C)   Resp 18   Ht 4' 10\" (1.473 m)   Wt 59 kg (130 lb)   LMP 12/01/2024   SpO2 97%   BMI 27.17 kg/m²        Physical Exam     Physical Exam  Vitals and nursing note reviewed.   Constitutional:       General: She is not in acute distress.     Appearance: Normal appearance. She is normal weight. She is not ill-appearing or toxic-appearing.   HENT:      Right Ear: Tympanic membrane normal.      Left Ear: Tympanic membrane normal.      Nose: Congestion present.      Mouth/Throat:      Mouth: Mucous membranes are dry.      Pharynx: No posterior oropharyngeal erythema.   Cardiovascular:      Rate and Rhythm: Normal rate and regular rhythm.      Pulses: Normal pulses.      Heart sounds: Normal heart sounds.   Pulmonary:      Effort: Pulmonary effort is normal.      Breath sounds: Normal breath sounds.   Abdominal:      Palpations: Abdomen is soft.      Tenderness: There is no abdominal tenderness. There is no guarding or rebound.   Skin:     General: Skin is warm and dry.      Capillary Refill: Capillary refill takes less than 2 seconds.   Neurological:      General: No focal deficit present.      Mental Status: She is alert and oriented to person, place, and time.                   "

## 2025-03-02 NOTE — PATIENT INSTRUCTIONS
Use a warm mist humidifier or vaporizer  Hot tea with honey.   Warm saline gargle or throat lozenge may help with a sore throat.  OTC saline nasal sprays   Drink plenty of fluids.   You can take over the counter cough and cold medicine

## 2025-03-04 ENCOUNTER — RESULTS FOLLOW-UP (OUTPATIENT)
Dept: URGENT CARE | Facility: CLINIC | Age: 35
End: 2025-03-04

## 2025-03-04 LAB
BACTERIA UR CULT: NORMAL
FLUAV RNA RESP QL NAA+PROBE: NEGATIVE
FLUBV RNA RESP QL NAA+PROBE: POSITIVE
SARS-COV-2 RNA RESP QL NAA+PROBE: NEGATIVE

## 2025-07-16 ENCOUNTER — HOSPITAL ENCOUNTER (EMERGENCY)
Facility: HOSPITAL | Age: 35
Discharge: HOME | End: 2025-07-16
Attending: EMERGENCY MEDICINE | Admitting: EMERGENCY MEDICINE
Payer: COMMERCIAL

## 2025-07-16 VITALS
HEART RATE: 85 BPM | RESPIRATION RATE: 18 BRPM | OXYGEN SATURATION: 96 % | DIASTOLIC BLOOD PRESSURE: 64 MMHG | SYSTOLIC BLOOD PRESSURE: 102 MMHG | TEMPERATURE: 97.6 F

## 2025-07-16 DIAGNOSIS — N39.0 UTI (URINARY TRACT INFECTION): ICD-10-CM

## 2025-07-16 DIAGNOSIS — R40.20 LOC (LOSS OF CONSCIOUSNESS) (HCC): Primary | ICD-10-CM

## 2025-07-16 LAB
ATRIAL RATE: 76 BPM
BACTERIA UR QL AUTO: ABNORMAL /HPF
BASOPHILS # BLD AUTO: 0.03 THOUSANDS/ÂΜL (ref 0–0.1)
BASOPHILS NFR BLD AUTO: 0 % (ref 0–1)
BILIRUB UR QL STRIP: NEGATIVE
CARDIAC TROPONIN I PNL SERPL HS: <2 NG/L (ref ?–50)
CLARITY UR: ABNORMAL
COLOR UR: YELLOW
EOSINOPHIL # BLD AUTO: 0.03 THOUSAND/ÂΜL (ref 0–0.61)
EOSINOPHIL NFR BLD AUTO: 0 % (ref 0–6)
ERYTHROCYTE [DISTWIDTH] IN BLOOD BY AUTOMATED COUNT: 13.2 % (ref 11.6–15.1)
GLUCOSE UR STRIP-MCNC: NEGATIVE MG/DL
HCT VFR BLD AUTO: 44.2 % (ref 34.8–46.1)
HGB BLD-MCNC: 14.3 G/DL (ref 11.5–15.4)
HGB UR QL STRIP.AUTO: ABNORMAL
IMM GRANULOCYTES # BLD AUTO: 0.04 THOUSAND/UL (ref 0–0.2)
IMM GRANULOCYTES NFR BLD AUTO: 0 % (ref 0–2)
KETONES UR STRIP-MCNC: ABNORMAL MG/DL
LACTATE SERPL-SCNC: 0.9 MMOL/L (ref 0.5–2)
LEUKOCYTE ESTERASE UR QL STRIP: ABNORMAL
LYMPHOCYTES # BLD AUTO: 1.29 THOUSANDS/ÂΜL (ref 0.6–4.47)
LYMPHOCYTES NFR BLD AUTO: 14 % (ref 14–44)
MCH RBC QN AUTO: 28.3 PG (ref 26.8–34.3)
MCHC RBC AUTO-ENTMCNC: 32.4 G/DL (ref 31.4–37.4)
MCV RBC AUTO: 87 FL (ref 82–98)
MONOCYTES # BLD AUTO: 0.97 THOUSAND/ÂΜL (ref 0.17–1.22)
MONOCYTES NFR BLD AUTO: 10 % (ref 4–12)
MUCOUS THREADS UR QL AUTO: ABNORMAL
NEUTROPHILS # BLD AUTO: 7.22 THOUSANDS/ÂΜL (ref 1.85–7.62)
NEUTS SEG NFR BLD AUTO: 76 % (ref 43–75)
NITRITE UR QL STRIP: NEGATIVE
NON-SQ EPI CELLS URNS QL MICRO: ABNORMAL /HPF
NRBC BLD AUTO-RTO: 0 /100 WBCS
P AXIS: 55 DEGREES
PH UR STRIP.AUTO: 6 [PH]
PLATELET # BLD AUTO: 220 THOUSANDS/UL (ref 149–390)
PMV BLD AUTO: 11.6 FL (ref 8.9–12.7)
PR INTERVAL: 124 MS
PROT UR STRIP-MCNC: ABNORMAL MG/DL
QRS AXIS: 50 DEGREES
QRSD INTERVAL: 72 MS
QT INTERVAL: 376 MS
QTC INTERVAL: 423 MS
RBC # BLD AUTO: 5.06 MILLION/UL (ref 3.81–5.12)
RBC #/AREA URNS AUTO: ABNORMAL /HPF
SP GR UR STRIP.AUTO: >=1.03 (ref 1–1.03)
T WAVE AXIS: -4 DEGREES
UROBILINOGEN UR QL STRIP.AUTO: 0.2 E.U./DL
VENTRICULAR RATE: 76 BPM
WBC # BLD AUTO: 9.58 THOUSAND/UL (ref 4.31–10.16)
WBC #/AREA URNS AUTO: ABNORMAL /HPF

## 2025-07-16 PROCEDURE — 84484 ASSAY OF TROPONIN QUANT: CPT | Performed by: EMERGENCY MEDICINE

## 2025-07-16 PROCEDURE — 85025 COMPLETE CBC W/AUTO DIFF WBC: CPT | Performed by: EMERGENCY MEDICINE

## 2025-07-16 PROCEDURE — 96365 THER/PROPH/DIAG IV INF INIT: CPT

## 2025-07-16 PROCEDURE — 99284 EMERGENCY DEPT VISIT MOD MDM: CPT | Performed by: EMERGENCY MEDICINE

## 2025-07-16 PROCEDURE — 83605 ASSAY OF LACTIC ACID: CPT | Performed by: EMERGENCY MEDICINE

## 2025-07-16 PROCEDURE — 99284 EMERGENCY DEPT VISIT MOD MDM: CPT

## 2025-07-16 PROCEDURE — 36415 COLL VENOUS BLD VENIPUNCTURE: CPT | Performed by: EMERGENCY MEDICINE

## 2025-07-16 PROCEDURE — 93005 ELECTROCARDIOGRAM TRACING: CPT

## 2025-07-16 PROCEDURE — 93010 ELECTROCARDIOGRAM REPORT: CPT | Performed by: INTERNAL MEDICINE

## 2025-07-16 PROCEDURE — 81001 URINALYSIS AUTO W/SCOPE: CPT | Performed by: EMERGENCY MEDICINE

## 2025-07-16 RX ORDER — CEPHALEXIN 500 MG/1
500 CAPSULE ORAL EVERY 12 HOURS SCHEDULED
Qty: 6 CAPSULE | Refills: 0 | Status: SHIPPED | OUTPATIENT
Start: 2025-07-16 | End: 2025-07-19

## 2025-07-16 RX ADMIN — SODIUM CHLORIDE, SODIUM LACTATE, POTASSIUM CHLORIDE, AND CALCIUM CHLORIDE 1000 ML: .6; .31; .03; .02 INJECTION, SOLUTION INTRAVENOUS at 17:35

## 2025-07-16 RX ADMIN — CEPHALEXIN 500 MG: 250 CAPSULE ORAL at 19:47

## 2025-07-16 NOTE — ED PROVIDER NOTES
Time reflects when diagnosis was documented in both MDM as applicable and the Disposition within this note       Time User Action Codes Description Comment    7/16/2025  6:40 PM [place] Add [R40.20] LOC (loss of consciousness) (HCC)     7/16/2025  7:44 PM [place] Add [N39.0] UTI (urinary tract infection)           ED Disposition       ED Disposition   Discharge    Condition   Stable    Date/Time   Wed Jul 16, 2025  6:40 PM    Comment   Maylin German discharge to home/self care.                   Assessment & Plan       Medical Decision Making  1700: Patient appears well, vital signs reviewed.  Patient at her baseline mental status per sister.  History of epilepsy, off anticonvulsants.  Patient is to follow-up with her neurologist for treatment continuation.  Previous neuroimaging reviewed with patient and sister, offered to complete neuroimaging as patient is nonverbal and if she felt that her sister was not at her baseline, the sister reports that the patient is at her baseline and she would prefer not to complete neuroimaging.  Plan to check basic labs including urinalysis.  Plan to observe for any seizures.  Placed on the monitor to observe for any dysrhythmia.  Check basic labs including cardiac enzymes.  Check EKG.    1900: Labs reviewed.  The patient has remained stable throughout ED course.  Stable for discharge.    Amount and/or Complexity of Data Reviewed  External Data Reviewed: labs, radiology and notes.     Details: CT head 2023--no acute pathology  Labs: ordered.  ECG/medicine tests: ordered and independent interpretation performed.     Details: Normal sinus rhythm 76 bpm no acute ischemia.    Risk  Prescription drug management.             Medications   lactated ringers bolus 1,000 mL (0 mL Intravenous Stopped 7/16/25 1835)   cephalexin (KEFLEX) capsule 500 mg (500 mg Oral Given 7/16/25 1947)       ED Risk Strat Scores                    No data recorded        SBIRT 22yo+      Flowsheet  Row Most Recent Value   Initial Alcohol Screen: US AUDIT-C     1. How often do you have a drink containing alcohol? 0 Filed at: 07/16/2025 1646   2. How many drinks containing alcohol do you have on a typical day you are drinking?  0 Filed at: 07/16/2025 1646   3b. FEMALE Any Age, or MALE 65+: How often do you have 4 or more drinks on one occassion? 0 Filed at: 07/16/2025 1646   Audit-C Score 0 Filed at: 07/16/2025 1646   ALIZE: How many times in the past year have you...    Used an illegal drug or used a prescription medication for non-medical reasons? Never Filed at: 07/16/2025 1646                            History of Present Illness       Chief Complaint   Patient presents with    Seizure - Prior Hx Of     Pt woke from nap and asked to use bathroom, family states while pt was on toilet they had episode of muscle contractions and eye deviation, hx of seizures- denies incontinence or increased agitation post event       Past Medical History[1]   Past Surgical History[2]   Family History[3]   Social History[4]   E-Cigarette/Vaping    E-Cigarette Use Never User       E-Cigarette/Vaping Substances      I have reviewed and agree with the history as documented.       History provided by:  Medical records, patient, relative, caregiver and EMS personnel (Sister)  History limited by:  Patient nonverbal  Seizure - Prior Hx Of  Seizure activity on arrival: no    Seizure type:  Partial complex  Initial focality:  Upper extremity  Episode characteristics: abnormal movements, eye deviation and partial responsiveness    Postictal symptoms: confusion and somnolence    Return to baseline: yes    Severity:  Mild  Duration:  2 minutes  Timing:  Once  Number of seizures this episode:  1  Progression:  Resolved  Context comment:  History of epilepsy, Angelman syndrome, was on Lamictal, was taken off of this about 3 months ago.  Patient was sitting on the toilet when she had eye deviation, shaking of her upper body, went limp but her  eyes were open, approx 2 min      Review of Systems   Unable to perform ROS: Patient nonverbal           Objective       ED Triage Vitals [07/16/25 1648]   Temperature Pulse Blood Pressure Respirations SpO2 Patient Position - Orthostatic VS   97.6 °F (36.4 °C) 85 102/64 18 96 % Sitting      Temp src Heart Rate Source BP Location FiO2 (%) Pain Score    -- Monitor Left arm -- --      Vitals      Date and Time Temp Pulse SpO2 Resp BP Pain Score FACES Pain Rating User   07/16/25 1648 97.6 °F (36.4 °C) 85 96 % 18 102/64 -- -- SA            Physical Exam  Vitals and nursing note reviewed.   Constitutional:       General: She is not in acute distress.     Appearance: Normal appearance. She is not ill-appearing, toxic-appearing or diaphoretic.   HENT:      Head: Normocephalic and atraumatic.      Nose: Nose normal. No congestion or rhinorrhea.      Mouth/Throat:      Mouth: Mucous membranes are moist.      Pharynx: Oropharynx is clear. No oropharyngeal exudate or posterior oropharyngeal erythema.     Eyes:      General:         Right eye: No discharge.         Left eye: No discharge.      Extraocular Movements: Extraocular movements intact.      Conjunctiva/sclera: Conjunctivae normal.      Pupils: Pupils are equal, round, and reactive to light.       Cardiovascular:      Rate and Rhythm: Normal rate and regular rhythm.      Pulses: Normal pulses.      Heart sounds: Normal heart sounds. No murmur heard.     No gallop.   Pulmonary:      Effort: Pulmonary effort is normal. No respiratory distress.      Breath sounds: Normal breath sounds. No stridor. No wheezing, rhonchi or rales.   Chest:      Chest wall: No tenderness.   Abdominal:      General: Bowel sounds are normal. There is no distension.      Palpations: Abdomen is soft. There is no mass.      Tenderness: There is no abdominal tenderness. There is no right CVA tenderness, left CVA tenderness, guarding or rebound.      Hernia: No hernia is present.     Musculoskeletal:          General: Normal range of motion.      Cervical back: Normal range of motion and neck supple.      Comments: Truncated arms and legs     Skin:     General: Skin is warm and dry.      Capillary Refill: Capillary refill takes less than 2 seconds.     Neurological:      General: No focal deficit present.      Mental Status: She is alert. Mental status is at baseline.      Cranial Nerves: No cranial nerve deficit.      Sensory: No sensory deficit.      Motor: No weakness.      Gait: Gait normal.         Results Reviewed       Procedure Component Value Units Date/Time    Urine Microscopic [[id number]]  (Abnormal) Collected: 07/16/25 1856    Lab Status: Final result Specimen: Urine, Clean Catch Updated: 07/16/25 1909     RBC, UA 10-20 /hpf      WBC, UA 4-10 /hpf      Epithelial Cells Occasional /hpf      Bacteria, UA Occasional /hpf      MUCUS THREADS Moderate    UA w Reflex to Microscopic w Reflex to Culture [[id number]]  (Abnormal) Collected: 07/16/25 1856    Lab Status: Final result Specimen: Urine, Clean Catch Updated: 07/16/25 1903     Color, UA Yellow     Clarity, UA Slightly Cloudy     Specific Gravity, UA >=1.030     pH, UA 6.0     Leukocytes, UA Small     Nitrite, UA Negative     Protein, UA 30 (1+) mg/dl      Glucose, UA Negative mg/dl      Ketones, UA Trace mg/dl      Urobilinogen, UA 0.2 E.U./dl      Bilirubin, UA Negative     Occult Blood, UA Moderate    HS Troponin 0hr (reflex protocol) [[id number]]  (Normal) Collected: 07/16/25 1736    Lab Status: Final result Specimen: Blood from Arm, Right Updated: 07/16/25 1812     hs TnI 0hr <2 ng/L     Lactic acid, plasma (w/reflex if result > 2.0) [[id number]]  (Normal) Collected: 07/16/25 1736    Lab Status: Final result Specimen: Blood from Arm, Right Updated: 07/16/25 1808     LACTIC ACID 0.9 mmol/L     Narrative:      Result may be elevated if tourniquet was used during collection.    CBC and differential [[id number]]  (Abnormal) Collected: 07/16/25 1736    Lab  Status: Final result Specimen: Blood from Arm, Right Updated: 07/16/25 1744     WBC 9.58 Thousand/uL      RBC 5.06 Million/uL      Hemoglobin 14.3 g/dL      Hematocrit 44.2 %      MCV 87 fL      MCH 28.3 pg      MCHC 32.4 g/dL      RDW 13.2 %      MPV 11.6 fL      Platelets 220 Thousands/uL      nRBC 0 /100 WBCs      Segmented % 76 %      Immature Grans % 0 %      Lymphocytes % 14 %      Monocytes % 10 %      Eosinophils Relative 0 %      Basophils Relative 0 %      Absolute Neutrophils 7.22 Thousands/µL      Absolute Immature Grans 0.04 Thousand/uL      Absolute Lymphocytes 1.29 Thousands/µL      Absolute Monocytes 0.97 Thousand/µL      Eosinophils Absolute 0.03 Thousand/µL      Basophils Absolute 0.03 Thousands/µL             No orders to display       Procedures    ED Medication and Procedure Management   Prior to Admission Medications   Prescriptions Last Dose Informant Patient Reported? Taking?   ALPRAZolam (XANAX) 0.5 mg tablet   Yes No   benzocaine (ORABASE-B) 20 % PSTE   No No   Sig: Apply 1 application to the mouth or throat 4 (four) times a day as needed for mucositis   citalopram (CeleXA) 10 mg tablet   Yes No   dorzolamide-timolol (COSOPT) 22.3-6.8 MG/ML ophthalmic solution   Yes No   gabapentin (NEURONTIN) 100 mg capsule   Yes No   gabapentin (NEURONTIN) 250 mg/5 mL solution   Yes No   lamoTRIgine (LaMICtal) 100 mg tablet   Yes No   Sig: Take 100 mg by mouth daily   lamoTRIgine (LaMICtal) 25 mg tablet   Yes No   Sig: Take 50 mg by mouth daily at bedtime   latanoprost (XALATAN) 0.005 % ophthalmic solution   Yes No   risperiDONE (RisperDAL) 2 mg tablet   Yes No   Patient not taking: Reported on 3/2/2025      Facility-Administered Medications: None     Discharge Medication List as of 7/16/2025  7:45 PM        START taking these medications    Details   cephalexin (KEFLEX) 500 mg capsule Take 1 capsule (500 mg total) by mouth every 12 (twelve) hours for 3 days, Starting Wed 7/16/2025, Until Sat 7/19/2025,  Normal           CONTINUE these medications which have NOT CHANGED    Details   ALPRAZolam (XANAX) 0.5 mg tablet Historical Med      benzocaine (ORABASE-B) 20 % PSTE Apply 1 application to the mouth or throat 4 (four) times a day as needed for mucositis, Starting Wed 4/10/2019, Normal      citalopram (CeleXA) 10 mg tablet Historical Med      dorzolamide-timolol (COSOPT) 22.3-6.8 MG/ML ophthalmic solution Starting Thu 9/20/2018, Historical Med      gabapentin (NEURONTIN) 100 mg capsule Historical Med      gabapentin (NEURONTIN) 250 mg/5 mL solution Historical Med      !! lamoTRIgine (LaMICtal) 100 mg tablet Take 100 mg by mouth daily, Starting Sat 12/28/2024, Historical Med      !! lamoTRIgine (LaMICtal) 25 mg tablet Take 50 mg by mouth daily at bedtime, Starting Mon 2/3/2025, Historical Med      latanoprost (XALATAN) 0.005 % ophthalmic solution Starting Thu 10/18/2018, Historical Med      risperiDONE (RisperDAL) 2 mg tablet Starting Thu 10/18/2018, Historical Med       !! - Potential duplicate medications found. Please discuss with provider.        No discharge procedures on file.  ED SEPSIS DOCUMENTATION   Time reflects when diagnosis was documented in both MDM as applicable and the Disposition within this note       Time User Action Codes Description Comment    7/16/2025  6:40 PM [place] Add [R40.20] LOC (loss of consciousness) (HCC)     7/16/2025  7:44 PM [place] Add [N39.0] UTI (urinary tract infection)                      [1]   Past Medical History:  Diagnosis Date    Angelman syndrome     Epilepsy (HCC)     Glaucoma     Sinus infection     Varicella    [2]   Past Surgical History:  Procedure Laterality Date    CATARACT EXTRACTION     [3]   Family History  Problem Relation Name Age of Onset    No Known Problems Sister     [4]   Social History  Tobacco Use    Smoking status: Never    Smokeless tobacco: Never   Vaping Use    Vaping status: Never Used   Substance Use Topics    Alcohol use: Not  Currently    Drug use: Not Currently        [place] MD  07/16/25 2035

## 2025-07-16 NOTE — ED NOTES
Left message to call back. Please see 2 encounters. Need to discuss results, PA and appointment information below.    Provider at bedside at this time.     Ryan Tran  07/16/25 [id number]